# Patient Record
Sex: FEMALE | Race: WHITE | NOT HISPANIC OR LATINO | Employment: FULL TIME | URBAN - METROPOLITAN AREA
[De-identification: names, ages, dates, MRNs, and addresses within clinical notes are randomized per-mention and may not be internally consistent; named-entity substitution may affect disease eponyms.]

---

## 2023-09-21 PROBLEM — R01.1 HEART MURMUR: Status: ACTIVE | Noted: 2023-09-21

## 2023-09-21 PROBLEM — R11.2 POST-OPERATIVE NAUSEA AND VOMITING: Status: ACTIVE | Noted: 2023-09-21

## 2023-09-21 PROBLEM — L91.8 OTHER HYPERTROPHIC DISORDERS OF THE SKIN: Status: ACTIVE | Noted: 2020-12-04

## 2023-09-21 PROBLEM — E66.01 MORBID OBESITY WITH BODY MASS INDEX (BMI) OF 40.0 OR HIGHER (MULTI): Status: ACTIVE | Noted: 2023-09-21

## 2023-09-21 PROBLEM — E78.5 HYPERLIPIDEMIA: Status: ACTIVE | Noted: 2023-09-21

## 2023-09-21 PROBLEM — D22.70 MELANOCYTIC NEVI OF UNSPECIFIED LOWER LIMB, INCLUDING HIP: Status: ACTIVE | Noted: 2020-12-04

## 2023-09-21 PROBLEM — F43.21 COMPLICATED BEREAVEMENT: Status: ACTIVE | Noted: 2023-09-21

## 2023-09-21 PROBLEM — N63.10 BREAST MASS, RIGHT: Status: ACTIVE | Noted: 2023-09-21

## 2023-09-21 PROBLEM — D23.9 OTHER BENIGN NEOPLASM OF SKIN, UNSPECIFIED: Status: ACTIVE | Noted: 2020-12-04

## 2023-09-21 PROBLEM — L81.4 OTHER MELANIN HYPERPIGMENTATION: Status: ACTIVE | Noted: 2020-12-04

## 2023-09-21 PROBLEM — D22.60 MELANOCYTIC NEVI OF UNSPECIFIED UPPER LIMB, INCLUDING SHOULDER: Status: ACTIVE | Noted: 2020-12-04

## 2023-09-21 PROBLEM — D18.01 HEMANGIOMA OF SKIN AND SUBCUTANEOUS TISSUE: Status: ACTIVE | Noted: 2020-12-04

## 2023-09-21 PROBLEM — D23.10 BENIGN NEOPLASM OF EYELID: Status: ACTIVE | Noted: 2023-09-21

## 2023-09-21 PROBLEM — G47.33 OSA (OBSTRUCTIVE SLEEP APNEA): Status: ACTIVE | Noted: 2023-09-21

## 2023-09-21 PROBLEM — D22.5 MELANOCYTIC NEVI OF TRUNK: Status: ACTIVE | Noted: 2020-12-04

## 2023-09-21 PROBLEM — F41.8 DEPRESSION WITH ANXIETY: Status: ACTIVE | Noted: 2023-09-21

## 2023-09-21 PROBLEM — E53.8 VITAMIN B12 DEFICIENCY: Status: ACTIVE | Noted: 2023-09-21

## 2023-09-21 PROBLEM — N97.9 INFERTILITY, FEMALE: Status: ACTIVE | Noted: 2023-09-21

## 2023-09-21 PROBLEM — Z98.84 HISTORY OF BARIATRIC SURGERY: Status: ACTIVE | Noted: 2023-09-21

## 2023-09-21 PROBLEM — D48.5 NEOPLASM OF UNCERTAIN BEHAVIOR OF SKIN: Status: ACTIVE | Noted: 2020-12-04

## 2023-09-21 PROBLEM — M54.2 CHRONIC NECK PAIN: Status: ACTIVE | Noted: 2023-09-21

## 2023-09-21 PROBLEM — R73.9 HYPERGLYCEMIA: Status: ACTIVE | Noted: 2023-09-21

## 2023-09-21 PROBLEM — E61.1 IRON DEFICIENCY: Status: ACTIVE | Noted: 2023-09-21

## 2023-09-21 PROBLEM — G89.29 CHRONIC NECK PAIN: Status: ACTIVE | Noted: 2023-09-21

## 2023-09-21 PROBLEM — K21.9 GERD (GASTROESOPHAGEAL REFLUX DISEASE): Status: ACTIVE | Noted: 2023-09-21

## 2023-09-21 PROBLEM — R29.818 SUSPECTED SLEEP APNEA: Status: ACTIVE | Noted: 2023-09-21

## 2023-09-21 PROBLEM — C80.1 MALIGNANT NEOPLASM (MULTI): Status: ACTIVE | Noted: 2023-09-21

## 2023-09-21 PROBLEM — R06.2 WHEEZING: Status: ACTIVE | Noted: 2023-09-21

## 2023-09-21 PROBLEM — I10 BENIGN HYPERTENSION: Status: ACTIVE | Noted: 2023-09-21

## 2023-09-21 PROBLEM — E55.9 VITAMIN D DEFICIENCY: Status: ACTIVE | Noted: 2023-09-21

## 2023-09-21 PROBLEM — S46.819A TRAPEZIUS MUSCLE STRAIN: Status: ACTIVE | Noted: 2023-09-21

## 2023-09-21 PROBLEM — E04.9 GOITER: Status: ACTIVE | Noted: 2023-09-21

## 2023-09-21 PROBLEM — R25.1 TREMOR: Status: ACTIVE | Noted: 2023-09-21

## 2023-09-21 PROBLEM — F41.0 PANIC DISORDER: Status: ACTIVE | Noted: 2023-09-21

## 2023-09-21 PROBLEM — Z98.890 POST-OPERATIVE NAUSEA AND VOMITING: Status: ACTIVE | Noted: 2023-09-21

## 2023-09-21 PROBLEM — N39.0 URINARY TRACT INFECTION: Status: ACTIVE | Noted: 2023-09-21

## 2023-09-21 PROBLEM — M54.9 UPPER BACK PAIN: Status: ACTIVE | Noted: 2023-09-21

## 2023-09-21 PROBLEM — L73.8 OTHER SPECIFIED FOLLICULAR DISORDERS: Status: ACTIVE | Noted: 2020-12-04

## 2023-09-21 PROBLEM — L82.1 OTHER SEBORRHEIC KERATOSIS: Status: ACTIVE | Noted: 2020-12-04

## 2023-09-21 PROBLEM — H16.223 KERATOCONJUNCTIVITIS SICCA OF BOTH EYES NOT SPECIFIED AS SJOGREN'S: Status: ACTIVE | Noted: 2023-09-21

## 2023-09-21 PROBLEM — F40.01 PANIC DISORDER WITH AGORAPHOBIA AND MODERATE PANIC ATTACKS: Status: ACTIVE | Noted: 2023-09-21

## 2023-09-21 RX ORDER — LEVOFLOXACIN 500 MG/1
1 TABLET, FILM COATED ORAL DAILY
COMMUNITY
Start: 2022-05-02 | End: 2023-11-19

## 2023-09-21 RX ORDER — OXYCODONE HCL 5 MG/5 ML
SOLUTION, ORAL ORAL
COMMUNITY
Start: 2022-04-27 | End: 2024-02-20

## 2023-09-21 RX ORDER — OMEPRAZOLE 40 MG/1
1 CAPSULE, DELAYED RELEASE ORAL DAILY
COMMUNITY
Start: 2021-12-01 | End: 2024-02-20

## 2023-09-21 RX ORDER — ECHINACEA 400 MG
CAPSULE ORAL
COMMUNITY
Start: 2020-02-03 | End: 2023-11-19

## 2023-09-21 RX ORDER — BUPROPION HYDROCHLORIDE 300 MG/1
TABLET ORAL
COMMUNITY
Start: 2020-10-30 | End: 2023-11-19

## 2023-09-21 RX ORDER — ALBUTEROL SULFATE 90 UG/1
AEROSOL, METERED RESPIRATORY (INHALATION)
COMMUNITY
Start: 2020-10-06

## 2023-09-21 RX ORDER — ONDANSETRON 4 MG/1
TABLET, ORALLY DISINTEGRATING ORAL EVERY 6 HOURS
COMMUNITY
Start: 2022-04-27 | End: 2024-02-20

## 2023-09-21 RX ORDER — OMEPRAZOLE 20 MG/1
1 CAPSULE, DELAYED RELEASE ORAL DAILY
COMMUNITY
Start: 2022-11-17 | End: 2024-02-20

## 2023-09-21 RX ORDER — BUPROPION HYDROCHLORIDE 150 MG/1
1 TABLET ORAL DAILY
COMMUNITY
Start: 2020-10-30 | End: 2023-12-05 | Stop reason: SDUPTHER

## 2023-09-21 RX ORDER — AMLODIPINE BESYLATE 5 MG/1
1 TABLET ORAL DAILY
COMMUNITY
Start: 2020-02-03 | End: 2024-03-12

## 2023-09-21 RX ORDER — ALPRAZOLAM 0.5 MG/1
1 TABLET ORAL AS NEEDED
COMMUNITY
Start: 2020-01-27 | End: 2024-02-20

## 2023-09-21 RX ORDER — DULOXETIN HYDROCHLORIDE 60 MG/1
CAPSULE, DELAYED RELEASE ORAL
COMMUNITY
Start: 2021-03-24 | End: 2023-11-19

## 2023-09-21 RX ORDER — BUSPIRONE HYDROCHLORIDE 5 MG/1
1 TABLET ORAL 2 TIMES DAILY
COMMUNITY
Start: 2023-01-31 | End: 2023-11-19

## 2023-10-06 ENCOUNTER — PHARMACY VISIT (OUTPATIENT)
Dept: PHARMACY | Facility: CLINIC | Age: 53
End: 2023-10-06
Payer: COMMERCIAL

## 2023-10-06 PROCEDURE — RXMED WILLOW AMBULATORY MEDICATION CHARGE

## 2023-12-04 ENCOUNTER — PHARMACY VISIT (OUTPATIENT)
Dept: PHARMACY | Facility: CLINIC | Age: 53
End: 2023-12-04
Payer: COMMERCIAL

## 2023-12-04 PROCEDURE — RXMED WILLOW AMBULATORY MEDICATION CHARGE

## 2023-12-05 ENCOUNTER — TELEMEDICINE (OUTPATIENT)
Dept: BEHAVIORAL HEALTH | Facility: CLINIC | Age: 53
End: 2023-12-05
Payer: COMMERCIAL

## 2023-12-05 DIAGNOSIS — F41.0 PANIC DISORDER: ICD-10-CM

## 2023-12-05 PROCEDURE — 99214 OFFICE O/P EST MOD 30 MIN: CPT | Performed by: PSYCHIATRY & NEUROLOGY

## 2023-12-05 RX ORDER — BUPROPION HYDROCHLORIDE 150 MG/1
150 TABLET ORAL DAILY
Qty: 90 TABLET | Refills: 1 | Status: SHIPPED | OUTPATIENT
Start: 2023-12-05

## 2023-12-05 RX ORDER — DULOXETIN HYDROCHLORIDE 60 MG/1
120 CAPSULE, DELAYED RELEASE ORAL DAILY
Qty: 180 CAPSULE | Refills: 1 | Status: SHIPPED | OUTPATIENT
Start: 2023-12-05

## 2023-12-05 RX ORDER — BUSPIRONE HYDROCHLORIDE 5 MG/1
5 TABLET ORAL 2 TIMES DAILY
Qty: 180 TABLET | Refills: 1 | Status: SHIPPED | OUTPATIENT
Start: 2023-12-05

## 2023-12-05 RX ORDER — BUPROPION HYDROCHLORIDE 300 MG/1
TABLET ORAL
Qty: 90 TABLET | Refills: 1 | Status: SHIPPED | OUTPATIENT
Start: 2023-12-05

## 2023-12-05 NOTE — PROGRESS NOTES
Subjective   Patient ID: Gabrielle Guzman is a 53 y.o. female.    Anxiety  Presents for follow-up visit. Patient reports no excessive worry, panic or suicidal ideas.       Depression  Visit Type: follow-up  Patient presents with the following symptoms: psychomotor retardation.  Patient is not experiencing: excessive worry, feelings of worthlessness, panic and suicidal ideas.          Review of Systems   Psychiatric/Behavioral:  Positive for depression. Negative for hallucinations and suicidal ideas.        Objective   Physical Exam  Psychiatric:         Mood and Affect: Mood is depressed.         Speech: Speech normal.      Comments: Affect is full range         Assessment/Plan   Diagnoses and all orders for this visit:  Panic disorder  -     buPROPion XL (Wellbutrin XL) 150 mg 24 hr tablet; Take 1 tablet (150 mg) by mouth once daily.  -     buPROPion XL (Wellbutrin XL) 300 mg 24 hr tablet; TAKE 1 TABLET BY MOUTH DAILY WITH 150 MG  MG TOTAL DAILY DOSE  -     busPIRone (Buspar) 5 mg tablet; TAKE 1 TABLET BY MOUTH TWO TIMES A DAY  -     DULoxetine (Cymbalta) 60 mg DR capsule; TAKE 2 CAPSULES BY MOUTH ONCE DAILY      ASSESSMENT AND PLAN  Keep next scheduled follow up visit;  ---after business hours and on weekends: call [phone] to reach the answering service  ---for appointments and medication refills and any questions or concerns call [phone]  ---if you run out of your medication or need more refills between visits, call our office: [phone]  ---if you need immediate assistance or in case of emergency, dial 911 or go to the nearest emergency room   ---discussed Risks, benefits, side effects and alternative treatments today and came up with a treatment plan       that pt agreed upon today.

## 2023-12-09 ASSESSMENT — ENCOUNTER SYMPTOMS
FEELINGS OF WORTHLESSNESS: 0
PSYCHOMOTOR RETARDATION: 1
DEPRESSION: 1
PANIC: 0
HALLUCINATIONS: 0

## 2024-01-29 ENCOUNTER — PHARMACY VISIT (OUTPATIENT)
Dept: PHARMACY | Facility: CLINIC | Age: 54
End: 2024-01-29
Payer: COMMERCIAL

## 2024-01-29 PROCEDURE — RXMED WILLOW AMBULATORY MEDICATION CHARGE

## 2024-02-14 PROCEDURE — RXMED WILLOW AMBULATORY MEDICATION CHARGE

## 2024-02-20 ENCOUNTER — TELEMEDICINE (OUTPATIENT)
Dept: BEHAVIORAL HEALTH | Facility: CLINIC | Age: 54
End: 2024-02-20
Payer: COMMERCIAL

## 2024-02-20 DIAGNOSIS — F41.8 DEPRESSION WITH ANXIETY: ICD-10-CM

## 2024-02-20 PROCEDURE — 99214 OFFICE O/P EST MOD 30 MIN: CPT | Performed by: PSYCHIATRY & NEUROLOGY

## 2024-02-20 PROCEDURE — RXMED WILLOW AMBULATORY MEDICATION CHARGE

## 2024-02-20 RX ORDER — ARIPIPRAZOLE 2 MG/1
2 TABLET ORAL DAILY
Qty: 90 TABLET | Refills: 0 | Status: SHIPPED | OUTPATIENT
Start: 2024-02-20 | End: 2024-03-12 | Stop reason: SDUPTHER

## 2024-02-20 ASSESSMENT — ENCOUNTER SYMPTOMS
SLEEP DISTURBANCE: 0
DYSPHORIC MOOD: 0
NERVOUS/ANXIOUS: 0

## 2024-02-20 NOTE — PROGRESS NOTES
"Subjective   Patient ID: Gabrielle Guzman is a 53 y.o. female.    Anxiety  Patient reports no nervous/anxious behavior or suicidal ideas.         Chief Complaint   Patient presents with    Panic Attack    Anxiety     Pt c/o even, almost \"flat\" affect-- would like to have more spontaneous, pope emotion-- specifically \"janis\" \"eagerness\" rather than feeling as if steadily going through the day.  Panic attacks are in remission;    Review of Systems   Psychiatric/Behavioral:  Negative for dysphoric mood, sleep disturbance and suicidal ideas. The patient is not nervous/anxious.        Objective   Physical Exam  Psychiatric:         Attention and Perception: Attention normal.         Speech: Speech normal.         Behavior: Behavior is cooperative.         Thought Content: Thought content normal. Thought content does not include homicidal or suicidal ideation.         Cognition and Memory: Cognition normal.         Judgment: Judgment normal.      Comments: Mood-- flat  Affect- appropriate, mostly reactive         Assessment/Plan   Diagnoses and all orders for this visit:  Depression with anxiety  -     ARIPiprazole (Abilify) 2 mg tablet; Take 1 tablet (2 mg) by mouth once daily.    Current Outpatient Medications:     albuterol 90 mcg/actuation inhaler, Inhale., Disp: , Rfl:     ALPRAZolam (Xanax) 0.5 mg tablet, Take 1 tablet (0.5 mg) by mouth if needed., Disp: , Rfl:     ALPRAZolam (Xanax) 0.5 mg tablet, TAKE 1 TABLET BY MOUTH DAILY AS NEEDED FOR ANXIETY, Disp: 30 tablet, Rfl: 2    amLODIPine (Norvasc) 5 mg tablet, Take 1 tablet (5 mg) by mouth once daily., Disp: , Rfl:     ARIPiprazole (Abilify) 2 mg tablet, Take 1 tablet (2 mg) by mouth once daily., Disp: 90 tablet, Rfl: 0    buPROPion XL (Wellbutrin XL) 150 mg 24 hr tablet, Take 1 tablet (150 mg) by mouth once daily., Disp: 90 tablet, Rfl: 1    buPROPion XL (Wellbutrin XL) 300 mg 24 hr tablet, TAKE 1 TABLET BY MOUTH DAILY WITH 150 MG  MG TOTAL DAILY DOSE, Disp: " 90 tablet, Rfl: 1    busPIRone (Buspar) 5 mg tablet, TAKE 1 TABLET BY MOUTH TWO TIMES A DAY, Disp: 180 tablet, Rfl: 1    DULoxetine (Cymbalta) 60 mg DR capsule, TAKE 2 CAPSULES BY MOUTH ONCE DAILY, Disp: 180 capsule, Rfl: 1    multivitamin with iron (pediatric multivitamin-iron) tablet chewable split tablet, Take by mouth., Disp: , Rfl:     omeprazole (PriLOSEC) 20 mg DR capsule, Take 1 capsule (20 mg) by mouth once daily., Disp: , Rfl:     omeprazole (PriLOSEC) 40 mg DR capsule, Take 1 capsule (40 mg) by mouth once daily., Disp: , Rfl:     ondansetron ODT (Zofran-ODT) 4 mg disintegrating tablet, Take by mouth every 6 hours., Disp: , Rfl:     oxyCODONE (Roxicodone) 5 mg/5 mL solution, Take by mouth., Disp: , Rfl:    Patient Active Problem List   Diagnosis    GERD (gastroesophageal reflux disease)    Panic disorder    Depression with anxiety    Complicated bereavement    Chronic neck pain    Breast mass, right    Benign neoplasm of eyelid    Benign hypertension    Malignant neoplasm (CMS/HCC)    Keratoconjunctivitis sicca of both eyes not specified as Sjogren's    Iron deficiency    Infertility, female    Hyperlipidemia    Hyperglycemia    History of bariatric surgery    Heart murmur    Goiter    Melanocytic nevi of trunk    Melanocytic nevi of unspecified lower limb, including hip    Melanocytic nevi of unspecified upper limb, including shoulder    Morbid obesity with body mass index (BMI) of 40.0 or higher (CMS/HCC)    AMINATA (obstructive sleep apnea)    Hemangioma of skin and subcutaneous tissue    Neoplasm of uncertain behavior of skin    Other benign neoplasm of skin, unspecified    Other hypertrophic disorders of the skin    Other specified follicular disorders    Other melanin hyperpigmentation    Other seborrheic keratosis    Panic disorder with agoraphobia and moderate panic attacks    Post-operative nausea and vomiting    Suspected sleep apnea    Trapezius muscle strain    Tremor    Upper back pain    Urinary  tract infection    Vitamin B12 deficiency    Vitamin D deficiency    Wheezing     Continue Wellbutrin xl, duloxetine, buspar as directed;  Trial of abilify    ASSESSMENT AND PLAN  Keep next scheduled follow up visit;  ---after business hours and on weekends: call [phone] to reach the answering service  ---for appointments and medication refills and any questions or concerns call [phone]  ---if you run out of your medication or need more refills between visits, call our office: [phone]  ---if you need immediate assistance or in case of emergency, dial 911 or go to the nearest emergency room   ---discussed Risks, benefits, side effects and alternative treatments today and came up with a treatment plan       that pt agreed upon today.

## 2024-02-22 ENCOUNTER — PHARMACY VISIT (OUTPATIENT)
Dept: PHARMACY | Facility: CLINIC | Age: 54
End: 2024-02-22
Payer: COMMERCIAL

## 2024-03-12 ENCOUNTER — TELEMEDICINE (OUTPATIENT)
Dept: BEHAVIORAL HEALTH | Facility: CLINIC | Age: 54
End: 2024-03-12
Payer: COMMERCIAL

## 2024-03-12 DIAGNOSIS — F41.8 DEPRESSION WITH ANXIETY: ICD-10-CM

## 2024-03-12 PROCEDURE — 99214 OFFICE O/P EST MOD 30 MIN: CPT | Performed by: PSYCHIATRY & NEUROLOGY

## 2024-03-12 PROCEDURE — RXMED WILLOW AMBULATORY MEDICATION CHARGE

## 2024-03-12 RX ORDER — ARIPIPRAZOLE 5 MG/1
5 TABLET ORAL DAILY
Qty: 30 TABLET | Refills: 2 | Status: SHIPPED | OUTPATIENT
Start: 2024-03-12

## 2024-03-12 NOTE — PROGRESS NOTES
"Subjective   Patient ID: Gabrielle Guzman is a 53 y.o. female.  The encounter diagnosis was Depression with anxiety.  Pt reports even mood but reports lack of spontaneity of moods, and lack of pleasant and joyful feelings --- \"fullness of emotions\"-- despite being on wellburtrin xl +duloxetine.       Current Outpatient Medications:     albuterol 90 mcg/actuation inhaler, Inhale., Disp: , Rfl:     ALPRAZolam (Xanax) 0.5 mg tablet, TAKE 1 TABLET BY MOUTH DAILY AS NEEDED FOR ANXIETY, Disp: 30 tablet, Rfl: 2    ARIPiprazole (Abilify) 5 mg tablet, Take 1 tablet (5 mg) by mouth once daily., Disp: 30 tablet, Rfl: 2    buPROPion XL (Wellbutrin XL) 150 mg 24 hr tablet, Take 1 tablet (150 mg) by mouth once daily., Disp: 90 tablet, Rfl: 1    buPROPion XL (Wellbutrin XL) 300 mg 24 hr tablet, TAKE 1 TABLET BY MOUTH DAILY WITH 150 MG  MG TOTAL DAILY DOSE, Disp: 90 tablet, Rfl: 1    busPIRone (Buspar) 5 mg tablet, TAKE 1 TABLET BY MOUTH TWO TIMES A DAY, Disp: 180 tablet, Rfl: 1    DULoxetine (Cymbalta) 60 mg DR capsule, TAKE 2 CAPSULES BY MOUTH ONCE DAILY, Disp: 180 capsule, Rfl: 1  Patient Active Problem List   Diagnosis    GERD (gastroesophageal reflux disease)    Panic disorder    Depression with anxiety    Complicated bereavement    Chronic neck pain    Breast mass, right    Benign neoplasm of eyelid    Benign hypertension    Malignant neoplasm (CMS/HCC)    Keratoconjunctivitis sicca of both eyes not specified as Sjogren's    Iron deficiency    Infertility, female    Hyperlipidemia    Hyperglycemia    History of bariatric surgery    Heart murmur    Goiter    Melanocytic nevi of trunk    Melanocytic nevi of unspecified lower limb, including hip    Melanocytic nevi of unspecified upper limb, including shoulder    Morbid obesity with body mass index (BMI) of 40.0 or higher (CMS/HCC)    AMINATA (obstructive sleep apnea)    Hemangioma of skin and subcutaneous tissue    Neoplasm of uncertain behavior of skin    Other benign " "neoplasm of skin, unspecified    Other hypertrophic disorders of the skin    Other specified follicular disorders    Other melanin hyperpigmentation    Other seborrheic keratosis    Panic disorder with agoraphobia and moderate panic attacks    Post-operative nausea and vomiting    Suspected sleep apnea    Trapezius muscle strain    Tremor    Upper back pain    Urinary tract infection    Vitamin B12 deficiency    Vitamin D deficiency    Wheezing         HPI  Problem List Items Addressed This Visit       Depression with anxiety    Relevant Medications    ARIPiprazole (Abilify) 5 mg tablet    Other Relevant Orders    Follow Up In Psychiatry         Review of Systems   Psychiatric/Behavioral:  Negative for hallucinations, sleep disturbance and suicidal ideas. The patient is not nervous/anxious.        Objective   Physical Exam  Psychiatric:         Attention and Perception: Attention normal.         Mood and Affect: Affect is flat.         Speech: Speech normal.         Behavior: Behavior is cooperative.         Thought Content: Thought content is not paranoid. Thought content does not include homicidal or suicidal ideation.         Judgment: Judgment normal.      Comments: Mood -  reported as \"flat\"   affect :  Reactive, appropriate         Assessment/Plan   Diagnoses and all orders for this visit:  Depression with anxiety  -     ARIPiprazole (Abilify) 5 mg tablet; Take 1 tablet (5 mg) by mouth once daily.  -     Follow Up In Psychiatry; Future      ASSESSMENT AND PLAN  Keep next scheduled follow up visit;   Abilify to 5 mg daily;  ---after business hours and on weekends: call [phone] to reach the answering service  ---for appointments and medication refills and any questions or concerns call [phone]  ---if you run out of your medication or need more refills between visits, call our office: [phone]  ---if you need immediate assistance or in case of emergency, dial 911 or go to the nearest emergency room "   ---discussed Risks, benefits, side effects and alternative treatments today and came up with a treatment plan       that pt agreed upon today.

## 2024-03-13 ENCOUNTER — PHARMACY VISIT (OUTPATIENT)
Dept: PHARMACY | Facility: CLINIC | Age: 54
End: 2024-03-13
Payer: COMMERCIAL

## 2024-03-17 ASSESSMENT — ENCOUNTER SYMPTOMS
NERVOUS/ANXIOUS: 0
SLEEP DISTURBANCE: 0
HALLUCINATIONS: 0

## 2024-03-23 ENCOUNTER — PHARMACY VISIT (OUTPATIENT)
Dept: PHARMACY | Facility: CLINIC | Age: 54
End: 2024-03-23
Payer: COMMERCIAL

## 2024-03-23 PROCEDURE — RXMED WILLOW AMBULATORY MEDICATION CHARGE

## 2024-04-02 ENCOUNTER — APPOINTMENT (OUTPATIENT)
Dept: BEHAVIORAL HEALTH | Facility: CLINIC | Age: 54
End: 2024-04-02
Payer: COMMERCIAL

## 2024-04-22 PROCEDURE — RXMED WILLOW AMBULATORY MEDICATION CHARGE

## 2024-04-25 ENCOUNTER — PHARMACY VISIT (OUTPATIENT)
Dept: PHARMACY | Facility: CLINIC | Age: 54
End: 2024-04-25
Payer: COMMERCIAL

## 2024-06-14 ENCOUNTER — PHARMACY VISIT (OUTPATIENT)
Dept: PHARMACY | Facility: CLINIC | Age: 54
End: 2024-06-14
Payer: COMMERCIAL

## 2024-06-14 DIAGNOSIS — F41.0 PANIC DISORDER: ICD-10-CM

## 2024-06-14 PROCEDURE — RXMED WILLOW AMBULATORY MEDICATION CHARGE

## 2024-06-14 RX ORDER — ALPRAZOLAM 0.5 MG/1
0.5 TABLET ORAL DAILY PRN
Qty: 30 TABLET | Refills: 0 | Status: SHIPPED | OUTPATIENT
Start: 2024-06-14 | End: 2024-12-15

## 2024-07-20 DIAGNOSIS — F41.0 PANIC DISORDER: ICD-10-CM

## 2024-08-06 PROCEDURE — RXMED WILLOW AMBULATORY MEDICATION CHARGE

## 2024-08-06 RX ORDER — BUPROPION HYDROCHLORIDE 150 MG/1
150 TABLET ORAL EVERY MORNING
Qty: 90 TABLET | Refills: 0 | Status: SHIPPED | OUTPATIENT
Start: 2024-08-06

## 2024-08-13 ENCOUNTER — PHARMACY VISIT (OUTPATIENT)
Dept: PHARMACY | Facility: CLINIC | Age: 54
End: 2024-08-13
Payer: COMMERCIAL

## 2024-08-13 PROCEDURE — RXMED WILLOW AMBULATORY MEDICATION CHARGE

## 2024-08-27 ENCOUNTER — APPOINTMENT (OUTPATIENT)
Dept: BEHAVIORAL HEALTH | Facility: CLINIC | Age: 54
End: 2024-08-27
Payer: COMMERCIAL

## 2024-08-27 DIAGNOSIS — F41.0 PANIC DISORDER: ICD-10-CM

## 2024-08-27 DIAGNOSIS — F32.9 MAJOR DEPRESSIVE DISORDER WITH CURRENT ACTIVE EPISODE, UNSPECIFIED DEPRESSION EPISODE SEVERITY, UNSPECIFIED WHETHER RECURRENT: ICD-10-CM

## 2024-08-27 PROCEDURE — 99214 OFFICE O/P EST MOD 30 MIN: CPT | Performed by: PSYCHIATRY & NEUROLOGY

## 2024-08-27 PROCEDURE — RXMED WILLOW AMBULATORY MEDICATION CHARGE

## 2024-08-27 RX ORDER — LAMOTRIGINE 25 MG/1
TABLET ORAL
Qty: 30 TABLET | Refills: 1 | Status: SHIPPED | OUTPATIENT
Start: 2024-08-27

## 2024-08-27 NOTE — PROGRESS NOTES
Subjective   Patient ID: Gabrielle Guzman is a 53 y.o. female.    HPI  Diagnoses of Major depressive disorder with current active episode, unspecified depression episode severity, unspecified whether recurrent and Panic disorder were pertinent to this visit.    Review of Systems  Psych Review of Symptoms:    Anxiety:   Generalized Anxiety Symptoms: No excessive worry, does not fatigues easily and no sleep disturbances due to anxiety.  Additional Anxiety Symptoms: No panic attacks.        Depressive Symptoms:   Depressed mood, decreased interest and irritable. No hopelessness and no suicidal ideation.      Manic Symptoms: Patient denied any symptoms.         Obsessive-Compulsive Symptoms: Patient denied any symptoms.         Psychotic Symptoms: Patient denied any symptoms.             Objective     Physical Exam  Psychiatric:         Attention and Perception: Perception normal.         Mood and Affect: Mood is depressed.         Behavior: Behavior is not hyperactive.         Thought Content: Thought content is not paranoid. Thought content does not include homicidal or suicidal ideation.         Judgment: Judgment normal.     Mood /affect:  depressive range without loss of function or SI/HI or psychotic sx; no exacerbation of anxiety sx;    Assessment/Plan   Stop lamictal immediately with any sign of rash;  Diagnoses and all orders for this visit:  Major depressive disorder with current active episode, unspecified depression episode severity, unspecified whether recurrent  -     lamoTRIgine (LaMICtal) 25 mg tablet; Take 1 tablet by mouth daily for two weeks, then increase to 1 tablet by mouth twice daily  -     Follow Up In Psychiatry; Future  Panic disorder  -     DULoxetine (Cymbalta) 60 mg DR capsule; TAKE 2 CAPSULES BY MOUTH ONCE DAILY  -     busPIRone (Buspar) 5 mg tablet; TAKE 1 TABLET BY MOUTH TWO TIMES A DAY  -     buPROPion XL (Wellbutrin XL) 150 mg 24 hr tablet; Take 1 tablet (150 mg) by mouth once daily in  the morning.  -     buPROPion XL (Wellbutrin XL) 300 mg 24 hr tablet; TAKE 1 TABLET BY MOUTH DAILY WITH 150 MG  MG TOTAL DAILY DOSE

## 2024-08-28 PROCEDURE — RXMED WILLOW AMBULATORY MEDICATION CHARGE

## 2024-08-28 RX ORDER — DULOXETIN HYDROCHLORIDE 60 MG/1
120 CAPSULE, DELAYED RELEASE ORAL DAILY
Qty: 180 CAPSULE | Refills: 1 | Status: SHIPPED | OUTPATIENT
Start: 2024-08-28

## 2024-08-28 RX ORDER — BUSPIRONE HYDROCHLORIDE 5 MG/1
5 TABLET ORAL 2 TIMES DAILY
Qty: 180 TABLET | Refills: 1 | Status: SHIPPED | OUTPATIENT
Start: 2024-08-28

## 2024-08-28 RX ORDER — BUPROPION HYDROCHLORIDE 300 MG/1
TABLET ORAL
Qty: 90 TABLET | Refills: 1 | Status: SHIPPED | OUTPATIENT
Start: 2024-08-28

## 2024-08-28 RX ORDER — BUPROPION HYDROCHLORIDE 150 MG/1
150 TABLET ORAL EVERY MORNING
Qty: 90 TABLET | Refills: 1 | Status: SHIPPED | OUTPATIENT
Start: 2024-08-28

## 2024-08-29 ASSESSMENT — ENCOUNTER SYMPTOMS
DEPRESSED MOOD: 1
FATIGUES EASILY: 0
PANIC: 0
HOPELESSNESS: 0

## 2024-08-31 ENCOUNTER — PHARMACY VISIT (OUTPATIENT)
Dept: PHARMACY | Facility: CLINIC | Age: 54
End: 2024-08-31
Payer: COMMERCIAL

## 2024-09-18 ENCOUNTER — APPOINTMENT (OUTPATIENT)
Dept: RADIOLOGY | Facility: HOSPITAL | Age: 54
End: 2024-09-18
Payer: COMMERCIAL

## 2024-09-18 DIAGNOSIS — Z12.31 SCREENING MAMMOGRAM FOR BREAST CANCER: ICD-10-CM

## 2024-11-05 ENCOUNTER — APPOINTMENT (OUTPATIENT)
Dept: BEHAVIORAL HEALTH | Facility: CLINIC | Age: 54
End: 2024-11-05
Payer: COMMERCIAL

## 2024-11-05 DIAGNOSIS — F41.8 DEPRESSION WITH ANXIETY: ICD-10-CM

## 2024-11-05 DIAGNOSIS — F41.0 PANIC DISORDER: ICD-10-CM

## 2024-11-05 PROCEDURE — RXMED WILLOW AMBULATORY MEDICATION CHARGE

## 2024-11-05 PROCEDURE — 99214 OFFICE O/P EST MOD 30 MIN: CPT | Performed by: PSYCHIATRY & NEUROLOGY

## 2024-11-05 RX ORDER — BUSPIRONE HYDROCHLORIDE 5 MG/1
5 TABLET ORAL 2 TIMES DAILY
Qty: 180 TABLET | Refills: 1 | Status: SHIPPED | OUTPATIENT
Start: 2024-11-05

## 2024-11-05 RX ORDER — BUPROPION HYDROCHLORIDE 300 MG/1
TABLET ORAL
Qty: 90 TABLET | Refills: 1 | Status: SHIPPED | OUTPATIENT
Start: 2024-11-05

## 2024-11-05 RX ORDER — BUPROPION HYDROCHLORIDE 150 MG/1
150 TABLET ORAL EVERY MORNING
Qty: 90 TABLET | Refills: 1 | Status: SHIPPED | OUTPATIENT
Start: 2024-11-05

## 2024-11-05 NOTE — PROGRESS NOTES
Subjective   Patient ID: Gabrielle Guzman is a 54 y.o. female.    HPI  Diagnoses of Panic disorder and Depression with anxiety were pertinent to this visit.  Pt stopped lamictal due to side effects-- ?rash  Pt reports some sleep continuity disturbance;  Pt also reports low energy  Current Outpatient Medications   Medication Instructions    albuterol 90 mcg/actuation inhaler inhalation    ALPRAZolam (Xanax) 0.5 mg tablet TAKE 1 TABLET BY MOUTH DAILY AS NEEDED FOR ANXIETY    buPROPion XL (Wellbutrin XL) 300 mg 24 hr tablet TAKE 1 TABLET BY MOUTH DAILY WITH 150 MG  MG TOTAL DAILY DOSE    buPROPion XL (WELLBUTRIN XL) 150 mg, oral, Every morning    busPIRone (Buspar) 5 mg tablet TAKE 1 TABLET BY MOUTH TWO TIMES A DAY    DULoxetine (Cymbalta) 30 mg DR capsule Take 1 capsule by mouth once daily with 60 mg strength for 90 mg total daily dose. Do not crush or chew.    DULoxetine (Cymbalta) 60 mg DR capsule Take 1 capsule by mouth once daily with 30 mg strength for 90 mg total daily dose    lamoTRIgine (LaMICtal) 25 mg tablet Take 1 tablet by mouth twice daily         Review of Systems   Psychiatric/Behavioral:  Negative for hallucinations. The patient has insomnia.      Psych Review of Symptoms:    Anxiety:   Generalized Anxiety Symptoms: No excessive worry.  Additional Anxiety Symptoms: No panic attacks.        Depressive Symptoms:   Irritable, psychomotor retardation and insomnia. No guilt, no low self esteem and no suicidal ideation.      Manic Symptoms:   No decreased need for sleep and no distinct period of increased energy/activity.      Psychotic Symptoms:   No delusions, no disorganized speech and no hallucinations.        Sleep Concerns:   Difficulty staying asleep.         Objective     Physical Exam  Psychiatric:         Mood and Affect: Affect is not labile or flat.         Speech: Speech normal.         Behavior: Behavior is cooperative.         Thought Content: Thought content is not delusional. Thought  content does not include homicidal or suicidal ideation.         Judgment: Judgment normal.         Assessment/Plan   Diagnoses and all orders for this visit:  Panic disorder  -     buPROPion XL (Wellbutrin XL) 300 mg 24 hr tablet; TAKE 1 TABLET BY MOUTH DAILY WITH 150 MG  MG TOTAL DAILY DOSE  -     buPROPion XL (Wellbutrin XL) 150 mg 24 hr tablet; Take 1 tablet (150 mg) by mouth once daily in the morning.  -     busPIRone (Buspar) 5 mg tablet; TAKE 1 TABLET BY MOUTH TWO TIMES A DAY  -     Follow Up In Psychiatry; Future  -     DULoxetine (Cymbalta) 60 mg DR capsule; Take 1 capsule by mouth once daily with 30 mg strength for 90 mg total daily dose  Depression with anxiety  -     DULoxetine (Cymbalta) 30 mg DR capsule; Take 1 capsule by mouth once daily with 60 mg strength for 90 mg total daily dose. Do not crush or chew.

## 2024-11-07 PROCEDURE — RXMED WILLOW AMBULATORY MEDICATION CHARGE

## 2024-11-07 RX ORDER — DULOXETIN HYDROCHLORIDE 60 MG/1
CAPSULE, DELAYED RELEASE ORAL
Qty: 90 CAPSULE | Refills: 1 | Status: SHIPPED | OUTPATIENT
Start: 2024-11-07

## 2024-11-07 RX ORDER — DULOXETIN HYDROCHLORIDE 30 MG/1
CAPSULE, DELAYED RELEASE ORAL
Qty: 90 CAPSULE | Refills: 1 | Status: SHIPPED | OUTPATIENT
Start: 2024-11-07

## 2024-11-09 ENCOUNTER — PHARMACY VISIT (OUTPATIENT)
Dept: PHARMACY | Facility: CLINIC | Age: 54
End: 2024-11-09
Payer: COMMERCIAL

## 2024-11-11 ASSESSMENT — ENCOUNTER SYMPTOMS
DISORGANIZED SPEECH: 0
INSOMNIA: 1
PSYCHOMOTOR RETARDATION: 1
DECREASED NEED FOR SLEEP: 0
HALLUCINATIONS: 0
PANIC: 0
DELUSIONS: 0

## 2025-02-11 ENCOUNTER — PHARMACY VISIT (OUTPATIENT)
Dept: PHARMACY | Facility: CLINIC | Age: 55
End: 2025-02-11
Payer: COMMERCIAL

## 2025-02-11 PROCEDURE — RXMED WILLOW AMBULATORY MEDICATION CHARGE

## 2025-03-25 ENCOUNTER — APPOINTMENT (OUTPATIENT)
Dept: BEHAVIORAL HEALTH | Facility: CLINIC | Age: 55
End: 2025-03-25
Payer: COMMERCIAL

## 2025-03-25 DIAGNOSIS — F41.8 DEPRESSION WITH ANXIETY: ICD-10-CM

## 2025-03-25 DIAGNOSIS — F41.0 PANIC DISORDER: ICD-10-CM

## 2025-03-25 PROCEDURE — 99214 OFFICE O/P EST MOD 30 MIN: CPT | Performed by: PSYCHIATRY & NEUROLOGY

## 2025-03-25 PROCEDURE — RXMED WILLOW AMBULATORY MEDICATION CHARGE

## 2025-03-25 RX ORDER — BUSPIRONE HYDROCHLORIDE 5 MG/1
5 TABLET ORAL 2 TIMES DAILY
Qty: 180 TABLET | Refills: 1 | Status: SHIPPED | OUTPATIENT
Start: 2025-03-25

## 2025-03-25 RX ORDER — BUPROPION HYDROCHLORIDE 300 MG/1
TABLET ORAL
Qty: 90 TABLET | Refills: 0 | Status: SHIPPED | OUTPATIENT
Start: 2025-03-25

## 2025-03-25 RX ORDER — VILAZODONE HYDROCHLORIDE 20 MG/1
20 TABLET ORAL DAILY
Qty: 30 TABLET | Refills: 1 | Status: SHIPPED | OUTPATIENT
Start: 2025-03-25

## 2025-03-25 RX ORDER — BUPROPION HYDROCHLORIDE 150 MG/1
150 TABLET ORAL EVERY MORNING
Qty: 90 TABLET | Refills: 0 | Status: SHIPPED | OUTPATIENT
Start: 2025-03-25

## 2025-03-25 RX ORDER — VILAZODONE HYDROCHLORIDE 10 MG/1
10 TABLET ORAL DAILY
Qty: 7 TABLET | Refills: 0 | Status: SHIPPED | OUTPATIENT
Start: 2025-03-25

## 2025-03-25 NOTE — PROGRESS NOTES
"Subjective   Patient ID: Gabrielle Guzman is a 54 y.o. female.    HPI  Diagnoses of Depression with anxiety and Panic disorder were pertinent to this visit.  +worsening symptoms without SI or loss of function;  Last couple of weeks -- \"out of nowhere\"  +tearfulness, similar to previous mood episodes;    Review of Systems   Psychiatric/Behavioral:  Negative for confusion, hallucinations and suicidal ideas.      Objective   Physical Exam  Psychiatric:         Attention and Perception: Perception normal.         Thought Content: Thought content does not include homicidal or suicidal ideation.     Mood/affect: episodically low    Assessment/Plan   Diagnoses and all orders for this visit:  Depression with anxiety  -     Follow Up In Psychiatry; Future  -     Basic metabolic panel; Future  -     Magnesium; Future  -     Phosphorus; Future  -     vilazodone (Viibryd) 10 mg tablet; Take 1 tablet (10 mg) by mouth once daily. Then start 20 mg tablets  -     vilazodone (Viibryd) 20 mg tablet; Take 1 tablet (20 mg) by mouth once daily after completing 10 mg tablets.  Panic disorder  -     buPROPion XL (Wellbutrin XL) 150 mg 24 hr tablet; Take 1 tablet (150 mg) by mouth once daily in the morning.  -     buPROPion XL (Wellbutrin XL) 300 mg 24 hr tablet; TAKE 1 TABLET BY MOUTH DAILY WITH 150 MG  MG TOTAL DAILY DOSE  -     busPIRone (Buspar) 5 mg tablet; Take 1 tablet (5 mg) by mouth 2 times a day.        "

## 2025-03-26 PROCEDURE — RXMED WILLOW AMBULATORY MEDICATION CHARGE

## 2025-03-28 ASSESSMENT — ENCOUNTER SYMPTOMS
CONFUSION: 0
HALLUCINATIONS: 0

## 2025-04-01 ENCOUNTER — APPOINTMENT (OUTPATIENT)
Dept: PRIMARY CARE | Facility: CLINIC | Age: 55
End: 2025-04-01
Payer: COMMERCIAL

## 2025-04-09 ENCOUNTER — PHARMACY VISIT (OUTPATIENT)
Dept: PHARMACY | Facility: CLINIC | Age: 55
End: 2025-04-09
Payer: COMMERCIAL

## 2025-04-15 ENCOUNTER — APPOINTMENT (OUTPATIENT)
Dept: BEHAVIORAL HEALTH | Facility: CLINIC | Age: 55
End: 2025-04-15
Payer: COMMERCIAL

## 2025-04-22 ENCOUNTER — APPOINTMENT (OUTPATIENT)
Dept: BEHAVIORAL HEALTH | Facility: CLINIC | Age: 55
End: 2025-04-22
Payer: COMMERCIAL

## 2025-05-13 ENCOUNTER — TELEMEDICINE (OUTPATIENT)
Dept: BEHAVIORAL HEALTH | Facility: CLINIC | Age: 55
End: 2025-05-13
Payer: COMMERCIAL

## 2025-05-13 DIAGNOSIS — F41.0 PANIC DISORDER: ICD-10-CM

## 2025-05-13 DIAGNOSIS — F41.8 DEPRESSION WITH ANXIETY: ICD-10-CM

## 2025-05-13 PROCEDURE — 99214 OFFICE O/P EST MOD 30 MIN: CPT | Performed by: PSYCHIATRY & NEUROLOGY

## 2025-05-13 PROCEDURE — RXMED WILLOW AMBULATORY MEDICATION CHARGE

## 2025-05-13 RX ORDER — CLONAZEPAM 0.5 MG/1
0.5 TABLET ORAL 2 TIMES DAILY
Qty: 60 TABLET | Refills: 0 | Status: SHIPPED | OUTPATIENT
Start: 2025-05-13

## 2025-05-13 RX ORDER — BUSPIRONE HYDROCHLORIDE 5 MG/1
5 TABLET ORAL 2 TIMES DAILY
Qty: 180 TABLET | Refills: 1 | Status: SHIPPED | OUTPATIENT
Start: 2025-05-13

## 2025-05-13 RX ORDER — BUPROPION HYDROCHLORIDE 150 MG/1
150 TABLET ORAL EVERY MORNING
Qty: 90 TABLET | Refills: 0 | Status: SHIPPED | OUTPATIENT
Start: 2025-05-13

## 2025-05-13 RX ORDER — BUPROPION HYDROCHLORIDE 300 MG/1
TABLET ORAL
Qty: 90 TABLET | Refills: 0 | Status: SHIPPED | OUTPATIENT
Start: 2025-05-13

## 2025-05-13 RX ORDER — VILAZODONE HYDROCHLORIDE 40 MG/1
40 TABLET ORAL DAILY
Qty: 30 TABLET | Refills: 1 | Status: SHIPPED | OUTPATIENT
Start: 2025-05-13

## 2025-05-13 NOTE — PROGRESS NOTES
Subjective   Patient ID: Gabrielle Guzman is a 54 y.o. female.    HPI  Diagnoses of Depression with anxiety and Panic disorder were pertinent to this visit.  +panic symptoms with irritability;  Also present, generalized anxiety;  +also-triggered by elevators, traffic in addition to any setting could trigger symptoms;  +low mood without SI;  Review of Systems   Psychiatric/Behavioral:  Positive for dysphoric mood. Negative for self-injury and suicidal ideas. The patient is nervous/anxious.        Objective   Physical Exam  Psychiatric:         Mood and Affect: Mood is depressed.         Behavior: Behavior is cooperative.         Thought Content: Thought content is not paranoid or delusional. Thought content does not include homicidal or suicidal ideation. Thought content does not include homicidal or suicidal plan.         Assessment/Plan   Diagnoses and all orders for this visit:  Depression with anxiety  -     vilazodone (Viibryd) 40 mg tablet; Take 1 tablet (40 mg) by mouth once daily.  -     Follow Up In Psychiatry; Future  Panic disorder  -     clonazePAM (KlonoPIN) 0.5 mg tablet; Take 1 tablet (0.5 mg) by mouth 2 times a day.  -     buPROPion XL (Wellbutrin XL) 300 mg 24 hr tablet; TAKE 1 TABLET BY MOUTH DAILY WITH 150 MG  MG TOTAL DAILY DOSE  -     buPROPion XL (Wellbutrin XL) 150 mg 24 hr tablet; Take 1 tablet (150 mg) by mouth once daily in the morning. (take with 300mg tab for a total of 450mg/day)  -     busPIRone (Buspar) 5 mg tablet; Take 1 tablet (5 mg) by mouth 2 times a day.  -     Follow Up In Psychiatry; Future

## 2025-05-14 ENCOUNTER — PHARMACY VISIT (OUTPATIENT)
Dept: PHARMACY | Facility: CLINIC | Age: 55
End: 2025-05-14
Payer: COMMERCIAL

## 2025-05-18 ASSESSMENT — ENCOUNTER SYMPTOMS
DYSPHORIC MOOD: 1
NERVOUS/ANXIOUS: 1

## 2025-06-06 DIAGNOSIS — F41.0 PANIC DISORDER: ICD-10-CM

## 2025-06-08 RX ORDER — CLONAZEPAM 0.5 MG/1
0.5 TABLET ORAL 2 TIMES DAILY
Qty: 60 TABLET | Refills: 1 | Status: SHIPPED | OUTPATIENT
Start: 2025-06-08

## 2025-06-11 PROCEDURE — RXMED WILLOW AMBULATORY MEDICATION CHARGE

## 2025-06-19 ENCOUNTER — PHARMACY VISIT (OUTPATIENT)
Dept: PHARMACY | Facility: CLINIC | Age: 55
End: 2025-06-19
Payer: COMMERCIAL

## 2025-06-19 PROCEDURE — RXMED WILLOW AMBULATORY MEDICATION CHARGE

## 2025-07-15 ENCOUNTER — APPOINTMENT (OUTPATIENT)
Dept: BEHAVIORAL HEALTH | Facility: CLINIC | Age: 55
End: 2025-07-15
Payer: COMMERCIAL

## 2025-07-15 DIAGNOSIS — F41.8 DEPRESSION WITH ANXIETY: ICD-10-CM

## 2025-07-15 DIAGNOSIS — F41.0 PANIC DISORDER: ICD-10-CM

## 2025-07-15 PROCEDURE — RXMED WILLOW AMBULATORY MEDICATION CHARGE

## 2025-07-15 PROCEDURE — 99214 OFFICE O/P EST MOD 30 MIN: CPT | Performed by: PSYCHIATRY & NEUROLOGY

## 2025-07-15 RX ORDER — BUPROPION HYDROCHLORIDE 150 MG/1
150 TABLET ORAL EVERY MORNING
Qty: 90 TABLET | Refills: 1 | Status: SHIPPED | OUTPATIENT
Start: 2025-07-15

## 2025-07-15 RX ORDER — VILAZODONE HYDROCHLORIDE 40 MG/1
40 TABLET ORAL DAILY
Qty: 90 TABLET | Refills: 1 | Status: SHIPPED | OUTPATIENT
Start: 2025-07-15

## 2025-07-15 RX ORDER — BUPROPION HYDROCHLORIDE 300 MG/1
TABLET ORAL
Qty: 90 TABLET | Refills: 1 | Status: SHIPPED | OUTPATIENT
Start: 2025-07-15

## 2025-07-15 RX ORDER — CLONAZEPAM 0.5 MG/1
0.5 TABLET ORAL
Qty: 45 TABLET | Refills: 2 | Status: SHIPPED | OUTPATIENT
Start: 2025-07-15

## 2025-07-15 NOTE — PROGRESS NOTES
Subjective   Patient ID: Gabrielle Guzman is a 54 y.o. female.    HPI  Patient Active Problem List    Diagnosis Date Noted    GERD (gastroesophageal reflux disease) 09/21/2023    Panic disorder 09/21/2023    Depression with anxiety 09/21/2023    Complicated bereavement 09/21/2023    Chronic neck pain 09/21/2023    Breast mass, right 09/21/2023    Benign neoplasm of eyelid 09/21/2023    Benign hypertension 09/21/2023    Malignant neoplasm (Multi) 09/21/2023    Keratoconjunctivitis sicca of both eyes not specified as Sjogren's 09/21/2023    Iron deficiency 09/21/2023    Infertility, female 09/21/2023    Hyperlipidemia 09/21/2023    Hyperglycemia 09/21/2023    History of bariatric surgery 09/21/2023    Heart murmur 09/21/2023    Goiter 09/21/2023    Morbid obesity with body mass index (BMI) of 40.0 or higher (Multi) 09/21/2023    AMINATA (obstructive sleep apnea) 09/21/2023    Panic disorder with agoraphobia and moderate panic attacks 09/21/2023    Post-operative nausea and vomiting 09/21/2023    Suspected sleep apnea 09/21/2023    Trapezius muscle strain 09/21/2023    Tremor 09/21/2023    Upper back pain 09/21/2023    Urinary tract infection 09/21/2023    Vitamin B12 deficiency 09/21/2023    Vitamin D deficiency 09/21/2023    Wheezing 09/21/2023    Melanocytic nevi of trunk 12/04/2020    Melanocytic nevi of unspecified lower limb, including hip 12/04/2020    Melanocytic nevi of unspecified upper limb, including shoulder 12/04/2020    Hemangioma of skin and subcutaneous tissue 12/04/2020    Neoplasm of uncertain behavior of skin 12/04/2020    Other benign neoplasm of skin, unspecified 12/04/2020    Other hypertrophic disorders of the skin 12/04/2020    Other specified follicular disorders 12/04/2020    Other melanin hyperpigmentation 12/04/2020    Other seborrheic keratosis 12/04/2020     Current Outpatient Medications   Medication Instructions    albuterol 90 mcg/actuation inhaler inhalation    ALPRAZolam (Xanax) 0.5 mg  tablet TAKE 1 TABLET BY MOUTH DAILY AS NEEDED FOR ANXIETY    buPROPion XL (Wellbutrin XL) 300 mg 24 hr tablet TAKE 1 TABLET BY MOUTH DAILY WITH 150 MG  MG TOTAL DAILY DOSE    buPROPion XL (WELLBUTRIN XL) 150 mg, oral, Every morning, (take with 300mg tab for a total of 450mg/day)    busPIRone (BUSPAR) 5 mg, oral, 2 times daily    clonazePAM (KlonoPIN) 0.5 mg tablet Take 1 tablet by mouth once or twice daily as needed for anxiety    vilazodone (VIIBRYD) 40 mg, oral, Daily   Reviewed psychotropic medications, effectiveness, side effects (if any) and symptom review today;  Pt reports that overall doing well on current psychotropic medication regimen which follows:  Bupropion xl 450 mg total daily dose;  Buspar 5 mg twice daily  Viibryd 40 mg once daily  Clonazepam 0.5 mg -- using once daily at bedtime     Pt denies panic attacks or exacerbation of anxiety or sleep disturbance;  Review of Systems   Psychiatric/Behavioral:  Negative for dysphoric mood, hallucinations, self-injury, sleep disturbance and suicidal ideas. The patient is not nervous/anxious.        Objective   Physical Exam    Psychiatric:         Attention and Perception: Perception normal.         Mood and Affect: Mood is not anxious or depressed.         Behavior: Behavior is cooperative.         Thought Content: Thought content is not paranoid. Thought content does not include homicidal or suicidal ideation.         Assessment/Plan   Diagnoses and all orders for this visit:  Depression with anxiety  -     Follow Up In Psychiatry  -     vilazodone (Viibryd) 40 mg tablet; Take 1 tablet (40 mg) by mouth once daily.  -     Follow Up In Psychiatry; Future  Panic disorder  -     Follow Up In Psychiatry  -     buPROPion XL (Wellbutrin XL) 150 mg 24 hr tablet; Take 1 tablet (150 mg) by mouth once daily in the morning. (take with 300mg tab for a total of 450mg/day)  -     buPROPion XL (Wellbutrin XL) 300 mg 24 hr tablet; TAKE 1 TABLET BY MOUTH DAILY WITH 150  MG  MG TOTAL DAILY DOSE  -     clonazePAM (KlonoPIN) 0.5 mg tablet; Take 1 tablet by mouth once or twice daily as needed for anxiety  -     Follow Up In Psychiatry; Future

## 2025-07-20 ASSESSMENT — ENCOUNTER SYMPTOMS
DYSPHORIC MOOD: 0
HALLUCINATIONS: 0
SLEEP DISTURBANCE: 0
NERVOUS/ANXIOUS: 0

## 2025-07-25 ENCOUNTER — PHARMACY VISIT (OUTPATIENT)
Dept: PHARMACY | Facility: CLINIC | Age: 55
End: 2025-07-25
Payer: COMMERCIAL

## 2025-07-25 PROCEDURE — RXMED WILLOW AMBULATORY MEDICATION CHARGE

## 2025-07-30 ENCOUNTER — PHARMACY VISIT (OUTPATIENT)
Dept: PHARMACY | Facility: CLINIC | Age: 55
End: 2025-07-30
Payer: COMMERCIAL

## 2025-08-19 DIAGNOSIS — Z12.31 ENCOUNTER FOR SCREENING MAMMOGRAM FOR BREAST CANCER: ICD-10-CM

## 2025-10-07 ENCOUNTER — APPOINTMENT (OUTPATIENT)
Dept: BEHAVIORAL HEALTH | Facility: CLINIC | Age: 55
End: 2025-10-07
Payer: COMMERCIAL